# Patient Record
Sex: FEMALE | Race: WHITE | NOT HISPANIC OR LATINO
[De-identification: names, ages, dates, MRNs, and addresses within clinical notes are randomized per-mention and may not be internally consistent; named-entity substitution may affect disease eponyms.]

---

## 2019-12-16 ENCOUNTER — TRANSCRIPTION ENCOUNTER (OUTPATIENT)
Age: 34
End: 2019-12-16

## 2020-04-26 ENCOUNTER — MESSAGE (OUTPATIENT)
Age: 35
End: 2020-04-26

## 2021-03-25 ENCOUNTER — TRANSCRIPTION ENCOUNTER (OUTPATIENT)
Age: 36
End: 2021-03-25

## 2021-08-25 ENCOUNTER — EMERGENCY (EMERGENCY)
Facility: HOSPITAL | Age: 36
LOS: 1 days | Discharge: HOME | End: 2021-08-25
Attending: STUDENT IN AN ORGANIZED HEALTH CARE EDUCATION/TRAINING PROGRAM | Admitting: STUDENT IN AN ORGANIZED HEALTH CARE EDUCATION/TRAINING PROGRAM
Payer: COMMERCIAL

## 2021-08-25 VITALS
HEART RATE: 80 BPM | HEIGHT: 63 IN | DIASTOLIC BLOOD PRESSURE: 96 MMHG | WEIGHT: 153 LBS | SYSTOLIC BLOOD PRESSURE: 145 MMHG | RESPIRATION RATE: 18 BRPM | TEMPERATURE: 98 F

## 2021-08-25 VITALS
RESPIRATION RATE: 18 BRPM | HEART RATE: 78 BPM | TEMPERATURE: 99 F | OXYGEN SATURATION: 97 % | DIASTOLIC BLOOD PRESSURE: 78 MMHG | SYSTOLIC BLOOD PRESSURE: 140 MMHG

## 2021-08-25 LAB
ANION GAP SERPL CALC-SCNC: 6 MMOL/L — SIGNIFICANT CHANGE UP (ref 5–17)
BASOPHILS # BLD AUTO: 0.02 K/UL — SIGNIFICANT CHANGE UP (ref 0–0.2)
BASOPHILS NFR BLD AUTO: 0.3 % — SIGNIFICANT CHANGE UP (ref 0–2)
BUN SERPL-MCNC: 8 MG/DL — SIGNIFICANT CHANGE UP (ref 7–23)
CALCIUM SERPL-MCNC: 8.8 MG/DL — SIGNIFICANT CHANGE UP (ref 8.5–10.1)
CHLORIDE SERPL-SCNC: 108 MMOL/L — SIGNIFICANT CHANGE UP (ref 96–108)
CO2 SERPL-SCNC: 28 MMOL/L — SIGNIFICANT CHANGE UP (ref 22–31)
CREAT SERPL-MCNC: 0.47 MG/DL — LOW (ref 0.5–1.3)
EOSINOPHIL # BLD AUTO: 0.07 K/UL — SIGNIFICANT CHANGE UP (ref 0–0.5)
EOSINOPHIL NFR BLD AUTO: 1.1 % — SIGNIFICANT CHANGE UP (ref 0–6)
GLUCOSE SERPL-MCNC: 83 MG/DL — SIGNIFICANT CHANGE UP (ref 70–99)
HCG SERPL-ACNC: <1 MIU/ML — SIGNIFICANT CHANGE UP
HCT VFR BLD CALC: 36.6 % — SIGNIFICANT CHANGE UP (ref 34.5–45)
HGB BLD-MCNC: 12.5 G/DL — SIGNIFICANT CHANGE UP (ref 11.5–15.5)
IMM GRANULOCYTES NFR BLD AUTO: 0.3 % — SIGNIFICANT CHANGE UP (ref 0–1.5)
LYMPHOCYTES # BLD AUTO: 1.81 K/UL — SIGNIFICANT CHANGE UP (ref 1–3.3)
LYMPHOCYTES # BLD AUTO: 29.5 % — SIGNIFICANT CHANGE UP (ref 13–44)
MCHC RBC-ENTMCNC: 28.9 PG — SIGNIFICANT CHANGE UP (ref 27–34)
MCHC RBC-ENTMCNC: 34.2 GM/DL — SIGNIFICANT CHANGE UP (ref 32–36)
MCV RBC AUTO: 84.5 FL — SIGNIFICANT CHANGE UP (ref 80–100)
MONOCYTES # BLD AUTO: 0.33 K/UL — SIGNIFICANT CHANGE UP (ref 0–0.9)
MONOCYTES NFR BLD AUTO: 5.4 % — SIGNIFICANT CHANGE UP (ref 2–14)
NEUTROPHILS # BLD AUTO: 3.89 K/UL — SIGNIFICANT CHANGE UP (ref 1.8–7.4)
NEUTROPHILS NFR BLD AUTO: 63.4 % — SIGNIFICANT CHANGE UP (ref 43–77)
NRBC # BLD: 0 /100 WBCS — SIGNIFICANT CHANGE UP (ref 0–0)
PLATELET # BLD AUTO: 251 K/UL — SIGNIFICANT CHANGE UP (ref 150–400)
POTASSIUM SERPL-MCNC: 3.7 MMOL/L — SIGNIFICANT CHANGE UP (ref 3.5–5.3)
POTASSIUM SERPL-SCNC: 3.7 MMOL/L — SIGNIFICANT CHANGE UP (ref 3.5–5.3)
RBC # BLD: 4.33 M/UL — SIGNIFICANT CHANGE UP (ref 3.8–5.2)
RBC # FLD: 11.9 % — SIGNIFICANT CHANGE UP (ref 10.3–14.5)
SODIUM SERPL-SCNC: 142 MMOL/L — SIGNIFICANT CHANGE UP (ref 135–145)
WBC # BLD: 6.14 K/UL — SIGNIFICANT CHANGE UP (ref 3.8–10.5)
WBC # FLD AUTO: 6.14 K/UL — SIGNIFICANT CHANGE UP (ref 3.8–10.5)

## 2021-08-25 PROCEDURE — 96374 THER/PROPH/DIAG INJ IV PUSH: CPT

## 2021-08-25 PROCEDURE — 36415 COLL VENOUS BLD VENIPUNCTURE: CPT

## 2021-08-25 PROCEDURE — 96375 TX/PRO/DX INJ NEW DRUG ADDON: CPT

## 2021-08-25 PROCEDURE — 84702 CHORIONIC GONADOTROPIN TEST: CPT

## 2021-08-25 PROCEDURE — 99284 EMERGENCY DEPT VISIT MOD MDM: CPT | Mod: 25

## 2021-08-25 PROCEDURE — 99284 EMERGENCY DEPT VISIT MOD MDM: CPT

## 2021-08-25 PROCEDURE — 85025 COMPLETE CBC W/AUTO DIFF WBC: CPT

## 2021-08-25 PROCEDURE — 80048 BASIC METABOLIC PNL TOTAL CA: CPT

## 2021-08-25 RX ORDER — ACETAMINOPHEN 500 MG
1000 TABLET ORAL ONCE
Refills: 0 | Status: COMPLETED | OUTPATIENT
Start: 2021-08-25 | End: 2021-08-25

## 2021-08-25 RX ORDER — KETOROLAC TROMETHAMINE 30 MG/ML
15 SYRINGE (ML) INJECTION ONCE
Refills: 0 | Status: DISCONTINUED | OUTPATIENT
Start: 2021-08-25 | End: 2021-08-25

## 2021-08-25 RX ORDER — SODIUM CHLORIDE 9 MG/ML
1000 INJECTION INTRAMUSCULAR; INTRAVENOUS; SUBCUTANEOUS ONCE
Refills: 0 | Status: COMPLETED | OUTPATIENT
Start: 2021-08-25 | End: 2021-08-25

## 2021-08-25 RX ADMIN — Medication 15 MILLIGRAM(S): at 16:06

## 2021-08-25 RX ADMIN — Medication 400 MILLIGRAM(S): at 16:07

## 2021-08-25 RX ADMIN — SODIUM CHLORIDE 1000 MILLILITER(S): 9 INJECTION INTRAMUSCULAR; INTRAVENOUS; SUBCUTANEOUS at 15:53

## 2021-08-25 NOTE — ED PROVIDER NOTE - ATTENDING CONTRIBUTION TO CARE
36yo F otherwise healthy, covid + x1 week, pw generalized malaise, fatigue, aches, mild sob, no chest pain, no pleurisy, no fevers, no diarrhea, tolerating PO but has no appetite  well appearing, vitals wnl, lungs clear, amb sat 100%  will check basic labs, supportive care

## 2021-08-25 NOTE — ED PROVIDER NOTE - OBJECTIVE STATEMENT
36 yo F presents to ED c/o generalized weakness, malaise, fatigue diffuse body aches x 1 week sp covid diagnosis. Contrary to triage note, pt denies shortness of breath or chest pain.

## 2021-08-25 NOTE — ED PROVIDER NOTE - NSFOLLOWUPINSTRUCTIONS_ED_ALL_ED_FT
1) Supportive care as discussed. Rest, fluids. F/u with your primary care physician once your quarantine is complete.  2) Return to the ER for worsening or concerning symptoms        How to Recover from COVID-19 at Home    WHAT YOU NEED TO KNOW:    What do I need to know about recovery at home from COVID-19? COVID-19 can cause a range of symptoms, from mild to severe. If you do not need to be treated in a hospital, you will be given instructions to use at home. You will need to watch for worsening symptoms and seek immediate care if needed. You will also need to stay physically apart from others so you do not spread the virus to anyone. Information about COVID-19 is still being learned. It is not known if a person can be infected with the virus again after recovering from COVID-19. It is also not known if or for how long the virus can continue to be passed to others.    What can I do to manage my symptoms? Mild symptoms may get better on their own. The following may be used to manage your symptoms:   •Decongestants help reduce nasal congestion and help you breathe more easily. If you take decongestant pills, they may make you feel restless or cause problems with your sleep. Do not use decongestant sprays for more than a few days.      •Cough suppressants help reduce coughing. Ask your healthcare provider which type of cough medicine is best for you.      •To soothe a sore throat, gargle with warm salt water, or use throat lozenges or a throat spray. Your healthcare provider may recommend a cough medicine. Drink more liquids to thin and loosen mucus and to prevent dehydration. Use decongestants or saline drops as directed for nasal congestion.      •NSAIDs, such as ibuprofen, help decrease swelling, pain, and fever. NSAIDs can cause stomach bleeding or kidney problems in certain people. If you take blood thinner medicine, always ask your healthcare provider if NSAIDs are safe for you. Always read the medicine label and follow directions.      •Acetaminophen decreases pain and fever. It is available without a doctor's order. Ask how much to take and how often to take it. Follow directions. Read the labels of all other medicines you are using to see if they also contain acetaminophen, or ask your doctor or pharmacist. Acetaminophen can cause liver damage if not taken correctly. Do not use more than 4 grams (4,000 milligrams) total of acetaminophen in one day.       How can I keep others safe while I am recovering at home? Healthcare providers will give you specific instructions to follow. The following are general guidelines to remind you how to keep others safe until you are well:    Prevent COVID-19 Infection     •Wash your hands often. Use soap and water as much as possible. You can use hand  that contains alcohol if soap and water are not available. Do not share towels with anyone. If you use paper towels, throw them away in a lined trash can kept in your room or area. Use a covered trash can, if possible.  Handwashing           •Cover sneezes and coughs. Turn your face away and cover your mouth and nose with a tissue. Throw the tissue away. Use the bend of your arm if a tissue is not available. Then wash your hands well with soap and water or use hand .      •Wear a face covering (mask) around anyone who does not live in your home. A covering will help prevent you from passing the virus to others. It is not known for sure if or for how long the virus can be passed after recovery. Do not wear a plastic face shield instead of a covering. Use a cloth covering with at least 2 layers. You can also create layers by putting a cloth covering over a disposable non-medical mask. Cover your mouth and your nose. The covering should fit snugly against the bridge of your nose. Securely fasten it under your chin and on the sides of your face. A face covering is not a substitute for other safety measures. Continue social distancing and washing your hands often.  How to Wear a Face Covering (Mask)           •Do not go out of your home unless it is necessary. If possible, ask someone who is not infected to go out for groceries, medicines, and household items. Ask your healthcare provider for other ways to have appointments. Some providers offer phone, video, or other types of appointments. If you need to be seen in person, call ahead to make sure the office will be ready for you.      •Do not let anyone into your home, room, or area unless it is necessary. If possible, stay in a separate area or room of your home if you live with others. No one should go into the area or room except to give you care. Only allow medical professionals or other necessary helpers in. Wear a face covering. Remind them to wear face coverings and to wash their hands. If possible, ask someone who is well to care for your baby. You can put breast milk in bottles for the person to use, if needed. Wear a clean face covering if you need to breastfeed or express or pump breast milk. Family members and friends should not visit you. You can visit with others by phone, video chat, e-mail, or similar systems. It is important to stay connected with others in your life while you recover.      •Talk to your healthcare provider about your baby. Tell him or her if you have any questions or concerns about caring for or bonding with your baby. He or she will tell you when to bring your baby in for check-ups and vaccines. He or she will also tell you what to do if you think your baby was infected with the coronavirus.      •Do not handle live animals unless it is necessary. Until more is known, it is best not to touch, play with, or handle live animals. Some animals, including pets, have been infected with the new coronavirus. Do not handle or care for animals until you are well. Care includes feeding, petting, and cuddling your pet. Do not let your pet lick you or share your food. Ask someone who is not infected to take care of your pet, if possible. If you must care for a pet, wear a face covering. Wash your hands before and after you give care. Talk to your healthcare provider about how to keep a service animal safe, if needed.      •Follow directions from your healthcare provider for being around others after you recover. It is not known for sure if or for how long a recovered person can pass the virus to others. Your provider may give you instructions, such as continuing social distancing or wearing a face covering around others. The following are general guidelines for when you can be around others:?If you never developed any symptoms, wait at least 10 days after your positive test. Your provider may want you to have 2 negative tests in a row at least 24 hours apart. This depends on how available testing is in your area.      ?If you did have symptoms, wait at least 10 days after the symptoms first appeared. Then you will need to have no fever for 24 hours without fever medicine. Most of your symptoms will also need to be gone. A loss of taste or smell may continue for several months. It is considered okay to be around others if this is your only symptom.      ?If you were hospitalized for COVID-19 and needed oxygen, your provider will tell you how long to wait. You may need to wait until 20 days after symptoms appeared. It may be less if you have 2 negative tests in a row at least 24 hours apart. This will depend on how available testing is in your area.        Where can I find more information?   •Centers for Disease Control and Prevention  [address] [place] [address]  Phone: 1-333.580.8449  Web Address: http://www.cdc.gov        What should I do if I think someone in my home may be infected? Do the following to protect others:   •If emergency care is needed, tell the  about the possible infection, or call ahead and tell the emergency department.      •Call a healthcare provider for instructions if symptoms are mild. Anyone who may be infected should not arrive without calling first. The provider will need to protect staff members and other patients.      •The person who may be infected needs to wear a face covering while getting medical care. This will help lower the risk of infecting others. Coverings are not used for anyone who is younger than 2 years, has breathing problems, or cannot remove it. The provider can give you instructions for anyone who cannot wear a covering.      Call your local emergency number (911 in the [place]) or an emergency department if:   •You have trouble breathing or shortness of breath at rest.      •You have chest pain or pressure that lasts longer than 5 minutes.      •You become confused or hard to wake.      •Your lips or face are blue.      •You have a fever of 104°F (40°C) or higher.      When should I call my doctor?   •You have new, returning, or worsening symptoms.      •Someone in your home does not have symptoms of COVID-19 but had close physical contact within 14 days with you.      •You have questions or concerns about your condition or care.      CARE AGREEMENT:    You have the right to help plan your care. Learn about your health condition and how it may be treated. Discuss treatment options with your healthcare providers to decide what care you want to receive. You always have the right to refuse treatment.        © Copyright [employer] 2021

## 2021-08-25 NOTE — ED PROVIDER NOTE - PATIENT PORTAL LINK FT
You can access the FollowMyHealth Patient Portal offered by [facility] by registering at the following website: [email or web address]. By joining [employer]’s FollowMyHealth portal, you will also be able to view your health information using other applications (apps) compatible with our system.

## 2021-08-25 NOTE — ED PROVIDER NOTE - NSICDXPASTMEDICALHX_GEN_ALL_CORE_FT
PAST MEDICAL HISTORY:  Blood pressure elevated a little high at drs office; being monitored    Genital herpes     Lightheadedness last week-went to  who thinks its possibly hypoglycemia; now eating 6 small meals per day and feels fine

## 2021-08-25 NOTE — ED PROVIDER NOTE - PROGRESS NOTE DETAILS
Labs wnl. pt reports symptomatic improvement. . VSS. O2 Sat 99% RA with exertion (pt walked around hospital room for a couple of mins - pt continues without chest pain or SOB) pt advised supportive care and pcp fu. Discussed the results of all diagnostic testing in ED and copies of all reports given.   Pt was given an opportunity to have all questions answered to satisfaction.  Discussed the importance of prompt, close medical follow-up. ED return precautions discussed at length.  Pt verbalizes agreement and understanding of plan and ED return precautions. Pt well appearing, stable for DC home. No emergent concerns at this time.

## 2021-08-25 NOTE — ED PROVIDER NOTE - CLINICAL SUMMARY MEDICAL DECISION MAKING FREE TEXT BOX
34 yo F known covid pos p/w weakness and body aches-- check basic labs, lytes, analgesia, IVF reassess

## 2022-02-15 ENCOUNTER — TRANSCRIPTION ENCOUNTER (OUTPATIENT)
Age: 37
End: 2022-02-15

## 2022-06-06 ENCOUNTER — APPOINTMENT (OUTPATIENT)
Dept: ANTEPARTUM | Facility: CLINIC | Age: 37
End: 2022-06-06
Payer: COMMERCIAL

## 2022-06-06 ENCOUNTER — ASOB RESULT (OUTPATIENT)
Age: 37
End: 2022-06-06

## 2022-06-06 PROBLEM — Z00.00 ENCOUNTER FOR PREVENTIVE HEALTH EXAMINATION: Status: ACTIVE | Noted: 2022-06-06

## 2022-06-06 PROCEDURE — 76801 OB US < 14 WKS SINGLE FETUS: CPT

## 2022-06-06 PROCEDURE — 76813 OB US NUCHAL MEAS 1 GEST: CPT | Mod: 59

## 2022-08-02 ENCOUNTER — ASOB RESULT (OUTPATIENT)
Age: 37
End: 2022-08-02

## 2022-08-02 ENCOUNTER — APPOINTMENT (OUTPATIENT)
Dept: ANTEPARTUM | Facility: CLINIC | Age: 37
End: 2022-08-02

## 2022-08-02 PROCEDURE — 76811 OB US DETAILED SNGL FETUS: CPT

## 2022-10-23 ENCOUNTER — OUTPATIENT (OUTPATIENT)
Dept: OUTPATIENT SERVICES | Facility: HOSPITAL | Age: 37
LOS: 1 days | End: 2022-10-23
Payer: COMMERCIAL

## 2022-10-23 VITALS
SYSTOLIC BLOOD PRESSURE: 132 MMHG | DIASTOLIC BLOOD PRESSURE: 63 MMHG | HEART RATE: 89 BPM | TEMPERATURE: 99 F | OXYGEN SATURATION: 97 % | RESPIRATION RATE: 18 BRPM

## 2022-10-23 DIAGNOSIS — O26.899 OTHER SPECIFIED PREGNANCY RELATED CONDITIONS, UNSPECIFIED TRIMESTER: ICD-10-CM

## 2022-10-23 DIAGNOSIS — Z3A.00 WEEKS OF GESTATION OF PREGNANCY NOT SPECIFIED: ICD-10-CM

## 2022-10-23 PROCEDURE — 59025 FETAL NON-STRESS TEST: CPT

## 2022-10-23 PROCEDURE — G0463: CPT

## 2022-10-23 NOTE — OB PROVIDER TRIAGE NOTE - NSOBPROVIDERNOTE_OBGYN_ALL_OB_FT
4 hour monitoring    Juventino, PGY3 Pt is a 37yo  @32.3 presenting s/p fall w/o abdominal trauma.    -BPP 8/8  -NST reactive  -CFM for 4hrs s/p fall  -pt is Rh+, no indication for rhogam at this time    Juventino, PGY3  d/w Dr. Valdez

## 2022-10-23 NOTE — OB PROVIDER TRIAGE NOTE - NSHPPHYSICALEXAM_GEN_ALL_CORE
Objective  T(C): 37.1 (10-23-22 @ 22:36), Max: 37.1 (10-23-22 @ 22:28)  HR: 91 (10-23-22 @ 23:05) (80 - 99)  BP: 119/58 (10-23-22 @ 23:05) (119/58 - 132/67)  RR: 18 (10-23-22 @ 22:28) (18 - 18)  SpO2: 98% (10-23-22 @ 23:05) (97% - 99%)    PE:   CV: RRR  Pulm: breathing comfortably on RA  Abd: gravid, nontender  Extr: moving all extremities with ease  FHT: baseline 130, mod variability, +accels, -decels  [place]: none  Sono: vertex Objective  T(C): 37.1 (10-23-22 @ 22:36), Max: 37.1 (10-23-22 @ 22:28)  HR: 91 (10-23-22 @ 23:05) (80 - 99)  BP: 119/58 (10-23-22 @ 23:05) (119/58 - 132/67)  RR: 18 (10-23-22 @ 22:28) (18 - 18)  SpO2: 98% (10-23-22 @ 23:05) (97% - 99%)    PE:   CV: RRR  Pulm: breathing comfortably on RA  Abd: gravid, nontender  Extr: moving all extremities with ease  FHT: baseline 130, mod variability, +accels, -decels  [place]: none  Sono: vertex, anterior placenta, MAXIMO 12.72, BPP 10/10

## 2022-10-23 NOTE — OB PROVIDER TRIAGE NOTE - HISTORY OF PRESENT ILLNESS
37yo  @32.3wks presents s/p fall this evening. Pt states she slipped on the hardwood floor at the top of her stairs & fell down 5 steps on her back & her bottom. Pt did not hit her abdomen, state she may have tapped her head on the stairs but did not have any significant head trauma. Pt c/o lower back & buttox pain. Denies LOF, ctxs, VB, vaginal discharge, dysuria, HA, CP, SOB.  Endorses good fetal movement. Denies any other concerns at this time.    – PNC: Denies prenatal issues. GBS unknown  – OBHx:   c/b gHTN-6#15  – GynHx: h/o abnormal pap smear now resolved. H/o HSV, last outbreak 1mo ago. Denies h/o fibroids.  – PMH: denies  – PSH: R ACL repair   – Psych: denies h/o anxiety/depression  – Social: denies h/o toxic habits in pregnancy  – Meds: PNV, ASA   – Allergies: NKDA  – Will accept blood transfusions? Yes

## 2022-10-24 VITALS — SYSTOLIC BLOOD PRESSURE: 130 MMHG | TEMPERATURE: 98 F | DIASTOLIC BLOOD PRESSURE: 78 MMHG | HEART RATE: 93 BPM

## 2022-11-21 ENCOUNTER — APPOINTMENT (OUTPATIENT)
Dept: ANTEPARTUM | Facility: CLINIC | Age: 37
End: 2022-11-21

## 2022-11-21 ENCOUNTER — ASOB RESULT (OUTPATIENT)
Age: 37
End: 2022-11-21

## 2022-11-21 PROCEDURE — 76816 OB US FOLLOW-UP PER FETUS: CPT

## 2022-11-21 PROCEDURE — 76819 FETAL BIOPHYS PROFIL W/O NST: CPT | Mod: 59

## 2022-12-05 NOTE — ED PROVIDER NOTE - SKIN, MLM
1. Other fatigue  A - check testosterone - fasting 8 AM.  B - repeat CBC in 1 month 1/5/2023  - Testosterone Free MS/Dialysis;  Future  - CBC with Auto Differential; Future
Skin normal color for race, warm, dry and intact. No evidence of rash.

## 2022-12-15 ENCOUNTER — APPOINTMENT (OUTPATIENT)
Dept: ANTEPARTUM | Facility: CLINIC | Age: 37
End: 2022-12-15

## 2022-12-15 ENCOUNTER — ASOB RESULT (OUTPATIENT)
Age: 37
End: 2022-12-15

## 2022-12-15 PROCEDURE — 76819 FETAL BIOPHYS PROFIL W/O NST: CPT | Mod: 59

## 2022-12-15 PROCEDURE — 76816 OB US FOLLOW-UP PER FETUS: CPT

## 2022-12-19 ENCOUNTER — INPATIENT (INPATIENT)
Facility: HOSPITAL | Age: 37
LOS: 1 days | Discharge: HOME | End: 2022-12-21
Attending: OBSTETRICS & GYNECOLOGY | Admitting: OBSTETRICS & GYNECOLOGY
Payer: COMMERCIAL

## 2022-12-19 VITALS — DIASTOLIC BLOOD PRESSURE: 74 MMHG | HEART RATE: 80 BPM | SYSTOLIC BLOOD PRESSURE: 151 MMHG

## 2022-12-19 DIAGNOSIS — Z98.890 OTHER SPECIFIED POSTPROCEDURAL STATES: Chronic | ICD-10-CM

## 2022-12-19 DIAGNOSIS — O26.899 OTHER SPECIFIED PREGNANCY RELATED CONDITIONS, UNSPECIFIED TRIMESTER: ICD-10-CM

## 2022-12-19 DIAGNOSIS — Z3A.00 WEEKS OF GESTATION OF PREGNANCY NOT SPECIFIED: ICD-10-CM

## 2022-12-19 DIAGNOSIS — Z34.80 ENCOUNTER FOR SUPERVISION OF OTHER NORMAL PREGNANCY, UNSPECIFIED TRIMESTER: ICD-10-CM

## 2022-12-19 LAB
ALBUMIN SERPL ELPH-MCNC: 4 G/DL — SIGNIFICANT CHANGE UP (ref 3.3–5)
ALP SERPL-CCNC: 150 U/L — HIGH (ref 40–120)
ALT FLD-CCNC: 14 U/L — SIGNIFICANT CHANGE UP (ref 10–45)
ANION GAP SERPL CALC-SCNC: 16 MMOL/L — SIGNIFICANT CHANGE UP (ref 5–17)
APPEARANCE UR: ABNORMAL
APTT BLD: 26.1 SEC — LOW (ref 27.5–35.5)
AST SERPL-CCNC: 25 U/L — SIGNIFICANT CHANGE UP (ref 10–40)
BACTERIA # UR AUTO: ABNORMAL
BASOPHILS # BLD AUTO: 0.03 K/UL — SIGNIFICANT CHANGE UP (ref 0–0.2)
BASOPHILS NFR BLD AUTO: 0.3 % — SIGNIFICANT CHANGE UP (ref 0–2)
BILIRUB SERPL-MCNC: 0.3 MG/DL — SIGNIFICANT CHANGE UP (ref 0.2–1.2)
BILIRUB UR-MCNC: NEGATIVE — SIGNIFICANT CHANGE UP
BLD GP AB SCN SERPL QL: NEGATIVE — SIGNIFICANT CHANGE UP
BUN SERPL-MCNC: 9 MG/DL — SIGNIFICANT CHANGE UP (ref 7–23)
CALCIUM SERPL-MCNC: 9.7 MG/DL — SIGNIFICANT CHANGE UP (ref 8.4–10.5)
CHLORIDE SERPL-SCNC: 102 MMOL/L — SIGNIFICANT CHANGE UP (ref 96–108)
CO2 SERPL-SCNC: 20 MMOL/L — LOW (ref 22–31)
COLOR SPEC: SIGNIFICANT CHANGE UP
COVID-19 SPIKE DOMAIN AB INTERP: POSITIVE
COVID-19 SPIKE DOMAIN ANTIBODY RESULT: >250 U/ML — HIGH
CREAT ?TM UR-MCNC: 68 MG/DL — SIGNIFICANT CHANGE UP
CREAT SERPL-MCNC: 0.47 MG/DL — LOW (ref 0.5–1.3)
DIFF PNL FLD: NEGATIVE — SIGNIFICANT CHANGE UP
EGFR: 127 ML/MIN/1.73M2 — SIGNIFICANT CHANGE UP
EOSINOPHIL # BLD AUTO: 0.05 K/UL — SIGNIFICANT CHANGE UP (ref 0–0.5)
EOSINOPHIL NFR BLD AUTO: 0.4 % — SIGNIFICANT CHANGE UP (ref 0–6)
EPI CELLS # UR: 9 /HPF — HIGH
FIBRINOGEN PPP-MCNC: 500 MG/DL — HIGH (ref 200–445)
GLUCOSE SERPL-MCNC: 61 MG/DL — LOW (ref 70–99)
GLUCOSE UR QL: NEGATIVE — SIGNIFICANT CHANGE UP
HCT VFR BLD CALC: 34.9 % — SIGNIFICANT CHANGE UP (ref 34.5–45)
HGB BLD-MCNC: 11.7 G/DL — SIGNIFICANT CHANGE UP (ref 11.5–15.5)
HYALINE CASTS # UR AUTO: 1 /LPF — SIGNIFICANT CHANGE UP (ref 0–7)
IMM GRANULOCYTES NFR BLD AUTO: 0.9 % — SIGNIFICANT CHANGE UP (ref 0–0.9)
INR BLD: 0.92 RATIO — SIGNIFICANT CHANGE UP (ref 0.88–1.16)
KETONES UR-MCNC: NEGATIVE — SIGNIFICANT CHANGE UP
LDH SERPL L TO P-CCNC: 225 U/L — SIGNIFICANT CHANGE UP (ref 50–242)
LEUKOCYTE ESTERASE UR-ACNC: NEGATIVE — SIGNIFICANT CHANGE UP
LYMPHOCYTES # BLD AUTO: 1.56 K/UL — SIGNIFICANT CHANGE UP (ref 1–3.3)
LYMPHOCYTES # BLD AUTO: 13.4 % — SIGNIFICANT CHANGE UP (ref 13–44)
MCHC RBC-ENTMCNC: 29.5 PG — SIGNIFICANT CHANGE UP (ref 27–34)
MCHC RBC-ENTMCNC: 33.5 GM/DL — SIGNIFICANT CHANGE UP (ref 32–36)
MCV RBC AUTO: 87.9 FL — SIGNIFICANT CHANGE UP (ref 80–100)
MONOCYTES # BLD AUTO: 0.55 K/UL — SIGNIFICANT CHANGE UP (ref 0–0.9)
MONOCYTES NFR BLD AUTO: 4.7 % — SIGNIFICANT CHANGE UP (ref 2–14)
NEUTROPHILS # BLD AUTO: 9.37 K/UL — HIGH (ref 1.8–7.4)
NEUTROPHILS NFR BLD AUTO: 80.3 % — HIGH (ref 43–77)
NITRITE UR-MCNC: NEGATIVE — SIGNIFICANT CHANGE UP
NRBC # BLD: 0 /100 WBCS — SIGNIFICANT CHANGE UP (ref 0–0)
PH UR: 7 — SIGNIFICANT CHANGE UP (ref 5–8)
PLATELET # BLD AUTO: 231 K/UL — SIGNIFICANT CHANGE UP (ref 150–400)
POTASSIUM SERPL-MCNC: 3.8 MMOL/L — SIGNIFICANT CHANGE UP (ref 3.5–5.3)
POTASSIUM SERPL-SCNC: 3.8 MMOL/L — SIGNIFICANT CHANGE UP (ref 3.5–5.3)
PROT ?TM UR-MCNC: 11 MG/DL — SIGNIFICANT CHANGE UP (ref 0–12)
PROT SERPL-MCNC: 7 G/DL — SIGNIFICANT CHANGE UP (ref 6–8.3)
PROT UR-MCNC: SIGNIFICANT CHANGE UP
PROT/CREAT UR-RTO: 0.2 RATIO — SIGNIFICANT CHANGE UP (ref 0–0.2)
PROTHROM AB SERPL-ACNC: 10.7 SEC — SIGNIFICANT CHANGE UP (ref 10.5–13.4)
RBC # BLD: 3.97 M/UL — SIGNIFICANT CHANGE UP (ref 3.8–5.2)
RBC # FLD: 14.1 % — SIGNIFICANT CHANGE UP (ref 10.3–14.5)
RBC CASTS # UR COMP ASSIST: 4 /HPF — SIGNIFICANT CHANGE UP (ref 0–4)
RH IG SCN BLD-IMP: POSITIVE — SIGNIFICANT CHANGE UP
SARS-COV-2 IGG+IGM SERPL QL IA: >250 U/ML — HIGH
SARS-COV-2 IGG+IGM SERPL QL IA: POSITIVE
SARS-COV-2 RNA SPEC QL NAA+PROBE: SIGNIFICANT CHANGE UP
SODIUM SERPL-SCNC: 138 MMOL/L — SIGNIFICANT CHANGE UP (ref 135–145)
SP GR SPEC: 1.02 — SIGNIFICANT CHANGE UP (ref 1.01–1.02)
URATE SERPL-MCNC: 3.7 MG/DL — SIGNIFICANT CHANGE UP (ref 2.5–7)
UROBILINOGEN FLD QL: NEGATIVE — SIGNIFICANT CHANGE UP
WBC # BLD: 11.66 K/UL — HIGH (ref 3.8–10.5)
WBC # FLD AUTO: 11.66 K/UL — HIGH (ref 3.8–10.5)
WBC UR QL: 13 /HPF — HIGH (ref 0–5)

## 2022-12-19 RX ORDER — LANOLIN
1 OINTMENT (GRAM) TOPICAL EVERY 6 HOURS
Refills: 0 | Status: DISCONTINUED | OUTPATIENT
Start: 2022-12-19 | End: 2022-12-21

## 2022-12-19 RX ORDER — SODIUM CHLORIDE 9 MG/ML
1000 INJECTION, SOLUTION INTRAVENOUS
Refills: 0 | Status: DISCONTINUED | OUTPATIENT
Start: 2022-12-19 | End: 2022-12-20

## 2022-12-19 RX ORDER — DIBUCAINE 1 %
1 OINTMENT (GRAM) RECTAL EVERY 6 HOURS
Refills: 0 | Status: DISCONTINUED | OUTPATIENT
Start: 2022-12-19 | End: 2022-12-21

## 2022-12-19 RX ORDER — TETANUS TOXOID, REDUCED DIPHTHERIA TOXOID AND ACELLULAR PERTUSSIS VACCINE, ADSORBED 5; 2.5; 8; 8; 2.5 [IU]/.5ML; [IU]/.5ML; UG/.5ML; UG/.5ML; UG/.5ML
0.5 SUSPENSION INTRAMUSCULAR ONCE
Refills: 0 | Status: DISCONTINUED | OUTPATIENT
Start: 2022-12-19 | End: 2022-12-21

## 2022-12-19 RX ORDER — SIMETHICONE 80 MG/1
80 TABLET, CHEWABLE ORAL EVERY 4 HOURS
Refills: 0 | Status: DISCONTINUED | OUTPATIENT
Start: 2022-12-19 | End: 2022-12-21

## 2022-12-19 RX ORDER — DIPHENHYDRAMINE HCL 50 MG
25 CAPSULE ORAL EVERY 6 HOURS
Refills: 0 | Status: DISCONTINUED | OUTPATIENT
Start: 2022-12-19 | End: 2022-12-21

## 2022-12-19 RX ORDER — SODIUM CHLORIDE 9 MG/ML
3 INJECTION INTRAMUSCULAR; INTRAVENOUS; SUBCUTANEOUS EVERY 8 HOURS
Refills: 0 | Status: DISCONTINUED | OUTPATIENT
Start: 2022-12-19 | End: 2022-12-21

## 2022-12-19 RX ORDER — CITRIC ACID/SODIUM CITRATE 300-500 MG
15 SOLUTION, ORAL ORAL EVERY 6 HOURS
Refills: 0 | Status: DISCONTINUED | OUTPATIENT
Start: 2022-12-19 | End: 2022-12-20

## 2022-12-19 RX ORDER — ACETAMINOPHEN 500 MG
975 TABLET ORAL
Refills: 0 | Status: DISCONTINUED | OUTPATIENT
Start: 2022-12-19 | End: 2022-12-21

## 2022-12-19 RX ORDER — CHLORHEXIDINE GLUCONATE 213 G/1000ML
1 SOLUTION TOPICAL ONCE
Refills: 0 | Status: DISCONTINUED | OUTPATIENT
Start: 2022-12-19 | End: 2022-12-20

## 2022-12-19 RX ORDER — BENZOCAINE 10 %
1 GEL (GRAM) MUCOUS MEMBRANE EVERY 6 HOURS
Refills: 0 | Status: DISCONTINUED | OUTPATIENT
Start: 2022-12-19 | End: 2022-12-21

## 2022-12-19 RX ORDER — HYDROCORTISONE 1 %
1 OINTMENT (GRAM) TOPICAL EVERY 6 HOURS
Refills: 0 | Status: DISCONTINUED | OUTPATIENT
Start: 2022-12-19 | End: 2022-12-21

## 2022-12-19 RX ORDER — OXYCODONE HYDROCHLORIDE 5 MG/1
5 TABLET ORAL
Refills: 0 | Status: DISCONTINUED | OUTPATIENT
Start: 2022-12-19 | End: 2022-12-21

## 2022-12-19 RX ORDER — OXYTOCIN 10 UNIT/ML
333.33 VIAL (ML) INJECTION
Qty: 20 | Refills: 0 | Status: DISCONTINUED | OUTPATIENT
Start: 2022-12-19 | End: 2022-12-21

## 2022-12-19 RX ORDER — AER TRAVELER 0.5 G/1
1 SOLUTION RECTAL; TOPICAL EVERY 4 HOURS
Refills: 0 | Status: DISCONTINUED | OUTPATIENT
Start: 2022-12-19 | End: 2022-12-21

## 2022-12-19 RX ORDER — MAGNESIUM HYDROXIDE 400 MG/1
30 TABLET, CHEWABLE ORAL
Refills: 0 | Status: DISCONTINUED | OUTPATIENT
Start: 2022-12-19 | End: 2022-12-21

## 2022-12-19 RX ORDER — KETOROLAC TROMETHAMINE 30 MG/ML
30 SYRINGE (ML) INJECTION ONCE
Refills: 0 | Status: DISCONTINUED | OUTPATIENT
Start: 2022-12-19 | End: 2022-12-19

## 2022-12-19 RX ORDER — OXYTOCIN 10 UNIT/ML
4 VIAL (ML) INJECTION
Qty: 30 | Refills: 0 | Status: DISCONTINUED | OUTPATIENT
Start: 2022-12-19 | End: 2022-12-21

## 2022-12-19 RX ORDER — PRAMOXINE HYDROCHLORIDE 150 MG/15G
1 AEROSOL, FOAM RECTAL EVERY 4 HOURS
Refills: 0 | Status: DISCONTINUED | OUTPATIENT
Start: 2022-12-19 | End: 2022-12-21

## 2022-12-19 RX ORDER — OXYCODONE HYDROCHLORIDE 5 MG/1
5 TABLET ORAL ONCE
Refills: 0 | Status: DISCONTINUED | OUTPATIENT
Start: 2022-12-19 | End: 2022-12-21

## 2022-12-19 RX ORDER — IBUPROFEN 200 MG
600 TABLET ORAL EVERY 6 HOURS
Refills: 0 | Status: COMPLETED | OUTPATIENT
Start: 2022-12-19 | End: 2023-11-17

## 2022-12-19 RX ORDER — OXYTOCIN 10 UNIT/ML
41.67 VIAL (ML) INJECTION
Qty: 20 | Refills: 0 | Status: DISCONTINUED | OUTPATIENT
Start: 2022-12-19 | End: 2022-12-21

## 2022-12-19 RX ADMIN — SODIUM CHLORIDE 125 MILLILITER(S): 9 INJECTION, SOLUTION INTRAVENOUS at 11:50

## 2022-12-19 RX ADMIN — Medication 125 MILLIUNIT(S)/MIN: at 23:35

## 2022-12-19 RX ADMIN — SODIUM CHLORIDE 125 MILLILITER(S): 9 INJECTION, SOLUTION INTRAVENOUS at 11:25

## 2022-12-19 RX ADMIN — Medication 30 MILLIGRAM(S): at 23:03

## 2022-12-19 RX ADMIN — Medication 4 MILLIUNIT(S)/MIN: at 12:43

## 2022-12-19 NOTE — OB PROVIDER H&P - HISTORY OF PRESENT ILLNESS
PA Note:  37y  @40wks and 4 days gestation presenting with contractions and decreased FM. Patient states the ctx's started @330am, are q7-10m and rated as a 4/10. She denies LOF or VB. Patient also reports decreased FM since she woke up with ctx's but states she felt movement in the car when she was coming to hospital and in triage currently. PNC uncomplicated. GBS -. EFW 7#11 done by ultrasound.    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 Hx of abnormal pap smear with repeat paps WNL. Denies fibroids, ovarian cysts   PMHx: Denies  Medications: PNV, Valtrex, ASA  Allergies: NKDA  PSHx: R ACL repair  Social Hx: Denies etoh/tobacco/drug use. Denies anxiety/depression    Vital Signs Last 24 Hrs  T(C): --  T(F): --  HR: 90 (19 Dec 2022 10:55) (78 - 98)  BP: 128/64 (19 Dec 2022 10:50) (128/64 - 151/74)  BP(mean): --  RR: --  SpO2: 98% (19 Dec 2022 11:00) (97% - 98%)    General: Mildly uncomfortable with ctx's, A&Ox3  CV: RRR  Lungs: CTA b/l  Abdomen: Soft, NT, gravid    VE: 2.5/70/-3  SSE: negative, no lesions seen   Bedside sono: Vertex presentation   EFM: 140/moderate variability/+accels/no decels  [place]: q7-9m

## 2022-12-19 NOTE — OB RN DELIVERY SUMMARY - NSSELHIDDEN_OBGYN_ALL_OB_FT
[NS_DeliveryAttending1_OBGYN_ALL_OB_FT:[id number]],[NS_DeliveryRN_OBGYN_ALL_OB_FT:[id number]],[NS_DeliveryAttending2_OBGYN_ALL_OB_FT:[id number]]

## 2022-12-19 NOTE — OB RN PATIENT PROFILE - FALL HARM RISK - UNIVERSAL INTERVENTIONS
Bed in lowest position, wheels locked, appropriate side rails in place/Call bell, personal items and telephone in reach/Instruct patient to call for assistance before getting out of bed or chair/Non-slip footwear when patient is out of bed/[place] to call system/Physically safe environment - no spills, clutter or unnecessary equipment/Purposeful Proactive Rounding/Room/bathroom lighting operational, light cord in reach

## 2022-12-19 NOTE — PRE-ANESTHESIA EVALUATION ADULT - BP NONINVASIVE SYSTOLIC (MM HG)
134
Other (Free Text): present x months\\nno improvement with vinegar soaks, topical AF and oral abx\\n? paronychia vs fungal vs neoplasm \\nbx to clarify diagnosis\\nshe has stage 3 kidney dz
Note Text (......Xxx Chief Complaint.): This diagnosis correlates with the
Detail Level: Simple

## 2022-12-19 NOTE — OB PROVIDER H&P - NSHPPHYSICALEXAM_GEN_ALL_CORE
Vital Signs Last 24 Hrs  T(C): --  T(F): --  HR: 90 (19 Dec 2022 10:55) (78 - 98)  BP: 128/64 (19 Dec 2022 10:50) (128/64 - 151/74)  BP(mean): --  RR: --  SpO2: 98% (19 Dec 2022 11:00) (97% - 98%)    General: Mildly uncomfortable with ctx's, A&Ox3  CV: RRR  Lungs: CTA b/l  Abdomen: Soft, NT, gravid

## 2022-12-19 NOTE — OB PROVIDER DELIVERY SUMMARY - NSPROVIDERDELIVERYNOTE_OBGYN_ALL_OB_FT
of baby girl over small 2nd degree laceration  apgars   ebl 150  placenta delivered intact  repair of laceration with 2-0 vicryl

## 2022-12-19 NOTE — OB PROVIDER H&P - NSLOWPPHRISK_OBGYN_A_OB
No previous uterine incision/Johnson Pregnancy/Less than or equal to 4 previous vaginal births/No known bleeding disorder/No history of postpartum hemorrhage/No other PPH risks indicated

## 2022-12-19 NOTE — OB PROVIDER H&P - ASSESSMENT
A/P:  37y  @40wks and 4 days gestation presenting in early labor. Patient also noted to have decreased FM @ home this AM and mild range BP's in triage. +FM. GBS -. EFW 3600g  -Admit to L&D  -Routine labs  -HELLP labs, P/Cr ratio to be sent   -Continue to monitor BP's q15m   -EFM/[place]  -NPO, IVF, Bicitra  -Expectant mgmt  -Anesthesia consult  -Anticipate   D/w Dr. Fernandez who also saw pt at bedside  Poornima Bardales PA-C

## 2022-12-19 NOTE — OB RN DELIVERY SUMMARY - NS_SEPSISRSKCALC_OBGYN_ALL_OB_FT
EOS calculated successfully. EOS Risk Factor: 0.5/1000 live births ([facility] national incidence); GA=40w4d; Temp=98.6; ROM=3.283; GBS='Negative'; Antibiotics='No antibiotics or any antibiotics < 2 hrs prior to birth'

## 2022-12-19 NOTE — OB PROVIDER LABOR PROGRESS NOTE - ASSESSMENT
Active labor  anticipate vaginal delivery  will decrease pitocin and continue to monitor for labor progression

## 2022-12-20 ENCOUNTER — TRANSCRIPTION ENCOUNTER (OUTPATIENT)
Age: 37
End: 2022-12-20

## 2022-12-20 DIAGNOSIS — Z86.19 PERSONAL HISTORY OF OTHER INFECTIOUS AND PARASITIC DISEASES: ICD-10-CM

## 2022-12-20 LAB — T PALLIDUM AB TITR SER: NEGATIVE — SIGNIFICANT CHANGE UP

## 2022-12-20 RX ORDER — VALACYCLOVIR 500 MG/1
500 TABLET, FILM COATED ORAL EVERY 12 HOURS
Refills: 0 | Status: DISCONTINUED | OUTPATIENT
Start: 2022-12-20 | End: 2022-12-21

## 2022-12-20 RX ORDER — IBUPROFEN 200 MG
600 TABLET ORAL EVERY 6 HOURS
Refills: 0 | Status: DISCONTINUED | OUTPATIENT
Start: 2022-12-20 | End: 2022-12-21

## 2022-12-20 RX ORDER — SIMETHICONE 80 MG/1
1 TABLET, CHEWABLE ORAL
Qty: 0 | Refills: 0 | DISCHARGE
Start: 2022-12-20

## 2022-12-20 RX ORDER — LANOLIN
1 OINTMENT (GRAM) TOPICAL
Qty: 0 | Refills: 0 | DISCHARGE
Start: 2022-12-20

## 2022-12-20 RX ORDER — ASPIRIN/CALCIUM CARB/MAGNESIUM 324 MG
1 TABLET ORAL
Qty: 0 | Refills: 0 | DISCHARGE

## 2022-12-20 RX ORDER — FERROUS SULFATE 325(65) MG
1 TABLET ORAL
Qty: 0 | Refills: 0 | DISCHARGE

## 2022-12-20 RX ORDER — DOCUSATE SODIUM 100 MG
1 CAPSULE ORAL
Qty: 0 | Refills: 0 | DISCHARGE

## 2022-12-20 RX ORDER — ACETAMINOPHEN 500 MG
2 TABLET ORAL
Qty: 0 | Refills: 0 | DISCHARGE

## 2022-12-20 RX ORDER — VALACYCLOVIR 500 MG/1
1 TABLET, FILM COATED ORAL
Qty: 0 | Refills: 0 | DISCHARGE

## 2022-12-20 RX ORDER — IBUPROFEN 200 MG
3 TABLET ORAL
Qty: 0 | Refills: 0 | DISCHARGE

## 2022-12-20 RX ADMIN — Medication 600 MILLIGRAM(S): at 11:31

## 2022-12-20 RX ADMIN — Medication 600 MILLIGRAM(S): at 06:33

## 2022-12-20 RX ADMIN — SODIUM CHLORIDE 3 MILLILITER(S): 9 INJECTION INTRAMUSCULAR; INTRAVENOUS; SUBCUTANEOUS at 05:11

## 2022-12-20 RX ADMIN — Medication 975 MILLIGRAM(S): at 22:00

## 2022-12-20 RX ADMIN — Medication 975 MILLIGRAM(S): at 21:14

## 2022-12-20 RX ADMIN — VALACYCLOVIR 500 MILLIGRAM(S): 500 TABLET, FILM COATED ORAL at 19:05

## 2022-12-20 RX ADMIN — Medication 975 MILLIGRAM(S): at 15:08

## 2022-12-20 RX ADMIN — Medication 600 MILLIGRAM(S): at 18:14

## 2022-12-20 RX ADMIN — Medication 975 MILLIGRAM(S): at 02:32

## 2022-12-20 RX ADMIN — Medication 975 MILLIGRAM(S): at 15:45

## 2022-12-20 RX ADMIN — Medication 600 MILLIGRAM(S): at 05:33

## 2022-12-20 RX ADMIN — Medication 975 MILLIGRAM(S): at 03:32

## 2022-12-20 RX ADMIN — Medication 975 MILLIGRAM(S): at 10:00

## 2022-12-20 RX ADMIN — Medication 1 TABLET(S): at 11:31

## 2022-12-20 RX ADMIN — Medication 975 MILLIGRAM(S): at 09:10

## 2022-12-20 RX ADMIN — Medication 600 MILLIGRAM(S): at 12:15

## 2022-12-20 RX ADMIN — Medication 600 MILLIGRAM(S): at 23:52

## 2022-12-20 NOTE — DISCHARGE NOTE OB - PATIENT PORTAL LINK FT
You can access the FollowMyHealth Patient Portal offered by [facility] by registering at the following website: [email or web address]. By joining [employer]’s FollowMyHealth portal, you will also be able to view your health information using other applications (apps) compatible with our system.

## 2022-12-20 NOTE — PROGRESS NOTE ADULT - NS ATTEND AMEND GEN_ALL_CORE FT
Ob Attg Note:  Pt is doing well.  I agree w/ the daily note entered today, and the plan of care. Ob Attg Note:  Pt is doing well.  I agree w/ the daily note entered today, and the plan of care.  pt to be discharged tomorrow.

## 2022-12-20 NOTE — DISCHARGE NOTE OB - CARE PROVIDER_API CALL
Maximus Valdez)  Obstetrics and Gynecology  [address] 234 beds at the [facility], [address]  [place] [address]  Phone: [phone]  Fax: [phone]  Follow Up Time:

## 2022-12-20 NOTE — DISCHARGE NOTE OB - MEDICATION SUMMARY - MEDICATIONS TO TAKE
I will START or STAY ON the medications listed below when I get home from the hospital:    ibuprofen 200 mg oral tablet  -- 3 tab(s) by mouth every 6 hours, As Needed  -- Indication: For pain, cramps or fever    acetaminophen 500 mg oral tablet  -- 2 tab(s) by mouth every 6 hours, As Needed  -- Indication: For pain, cramps or fever    lanolin topical ointment  -- 1 application on skin every 6 hours, As needed, nipple soreness  -- Indication: For cracked or sore nipples    Prenatal Multivitamins with Folic Acid 1 mg oral tablet  -- 1 tab(s) by mouth once a day  -- Indication: For Vitamins    Slow Fe (as elemental iron) 45 mg oral tablet, extended release  -- 1 tab(s) by mouth once a day  -- Indication: For iron supplement    Colace 100 mg oral capsule  -- 1 cap(s) by mouth 2 times a day, As Needed  -- Indication: For constipation    simethicone 80 mg oral tablet, chewable  -- 1 tab(s) by mouth every 4 hours, As needed, Gas  -- Indication: For gas pain

## 2022-12-20 NOTE — DISCHARGE NOTE OB - NS MD DC FALL RISK RISK
For information on Fall & Injury Prevention, visit: [email or web address] OR  [email or web address] OR  https://www.cdc.gov/steadi/patient.html

## 2022-12-20 NOTE — DISCHARGE NOTE OB - HOSPITAL COURSE
Patient delivered vaginally without any complications.  Her postpartum course was uneventful, and she reached appropriate postoperative milestones.  There was no excessive or abnormal vaginal bleeding.  Her uterine tone was normal.   There was no sing or symptom of infections.  She was discharged home on postpartum day number 2.    Discharge and follow up instructions discussed with patient.

## 2022-12-20 NOTE — DISCHARGE NOTE OB - MEDICATION SUMMARY - MEDICATIONS TO STOP TAKING
I will STOP taking the medications listed below when I get home from the hospital:    Valtrex 1 g oral tablet  -- 1 tab(s) by mouth 2 times a day    Aspir 81 oral delayed release tablet  -- 1 tab(s) by mouth once a day

## 2022-12-21 VITALS
SYSTOLIC BLOOD PRESSURE: 108 MMHG | HEART RATE: 75 BPM | RESPIRATION RATE: 17 BRPM | OXYGEN SATURATION: 99 % | TEMPERATURE: 99 F | DIASTOLIC BLOOD PRESSURE: 75 MMHG

## 2022-12-21 PROCEDURE — 82570 ASSAY OF URINE CREATININE: CPT

## 2022-12-21 PROCEDURE — 86900 BLOOD TYPING SEROLOGIC ABO: CPT

## 2022-12-21 PROCEDURE — 85610 PROTHROMBIN TIME: CPT

## 2022-12-21 PROCEDURE — 59025 FETAL NON-STRESS TEST: CPT

## 2022-12-21 PROCEDURE — 81001 URINALYSIS AUTO W/SCOPE: CPT

## 2022-12-21 PROCEDURE — 85025 COMPLETE CBC W/AUTO DIFF WBC: CPT

## 2022-12-21 PROCEDURE — 86850 RBC ANTIBODY SCREEN: CPT

## 2022-12-21 PROCEDURE — 84550 ASSAY OF BLOOD/URIC ACID: CPT

## 2022-12-21 PROCEDURE — 59050 FETAL MONITOR W/REPORT: CPT

## 2022-12-21 PROCEDURE — 87635 SARS-COV-2 COVID-19 AMP PRB: CPT

## 2022-12-21 PROCEDURE — 84156 ASSAY OF PROTEIN URINE: CPT

## 2022-12-21 PROCEDURE — 85730 THROMBOPLASTIN TIME PARTIAL: CPT

## 2022-12-21 PROCEDURE — 83615 LACTATE (LD) (LDH) ENZYME: CPT

## 2022-12-21 PROCEDURE — 80053 COMPREHEN METABOLIC PANEL: CPT

## 2022-12-21 PROCEDURE — 86780 TREPONEMA PALLIDUM: CPT

## 2022-12-21 PROCEDURE — 85384 FIBRINOGEN ACTIVITY: CPT

## 2022-12-21 PROCEDURE — 86901 BLOOD TYPING SEROLOGIC RH(D): CPT

## 2022-12-21 PROCEDURE — 86769 SARS-COV-2 COVID-19 ANTIBODY: CPT

## 2022-12-21 RX ADMIN — VALACYCLOVIR 500 MILLIGRAM(S): 500 TABLET, FILM COATED ORAL at 05:40

## 2022-12-21 RX ADMIN — Medication 1 TABLET(S): at 11:54

## 2022-12-21 RX ADMIN — Medication 975 MILLIGRAM(S): at 09:00

## 2022-12-21 RX ADMIN — Medication 600 MILLIGRAM(S): at 05:40

## 2022-12-21 RX ADMIN — Medication 975 MILLIGRAM(S): at 08:14

## 2022-12-21 RX ADMIN — Medication 600 MILLIGRAM(S): at 06:15

## 2022-12-21 RX ADMIN — Medication 600 MILLIGRAM(S): at 11:54

## 2022-12-21 RX ADMIN — Medication 975 MILLIGRAM(S): at 03:30

## 2022-12-21 RX ADMIN — Medication 975 MILLIGRAM(S): at 02:44

## 2022-12-21 RX ADMIN — Medication 600 MILLIGRAM(S): at 12:40

## 2022-12-21 RX ADMIN — Medication 600 MILLIGRAM(S): at 00:30

## 2022-12-21 NOTE — PROGRESS NOTE ADULT - ASSESSMENT
A: 37y PPD#2 s/p  doing well.    Plan:   discharge home today.    Follow-up and when to call MD discussed.    PLEE    
A/P:  37y  PPD # 1      S/P                   doing well

## 2022-12-21 NOTE — PROGRESS NOTE ADULT - SUBJECTIVE AND OBJECTIVE BOX
Postpartum Note- PPD#1    Allergies: No Known Allergies    Blood Type  O  --  Positive  Rubella immune  RPR Negative        Patient w/o complaints, pain is controlled.    Pt is OOB, tolerating PO, passing flatus. Lochia WNL.     O:  Vital Signs Last 24 Hrs  T(C): 36.7 (20 Dec 2022 05:12), Max: 37.2 (20 Dec 2022 00:05)  T(F): 98 (20 Dec 2022 05:12), Max: 99 (20 Dec 2022 00:05)  HR: 74 (20 Dec 2022 05:12) (64 - 124)  BP: 123/85 (20 Dec 2022 05:12) (123/71 - 153/69)  BP(mean): 92 (20 Dec 2022 00:05) (89 - 99)  RR: 17 (20 Dec 2022 05:12) (17 - 18)  SpO2: 99% (20 Dec 2022 05:12) (86% - 100%)    Parameters below as of 20 Dec 2022 05:12  Patient On (Oxygen Delivery Method): room air         Gen: NAD  Heart: S1S2 RRR  Lungs: CTA b/l  Abdomen: Soft, nontender, non-distended, fundus firm.  Lochia WNL  Ext: Neg edema, Neg calf tenderness    LABS:               11.7   11.66 )-----------( 231      ( 12-19 @ 13:20 )             34.9         PAST MEDICAL & SURGICAL HISTORY:  Blood pressure elevated  a little high at drs office; being monitored      Lightheadedness  last week-went to  who thinks its possibly hypoglycemia; now eating 6 small meals per day and feels fine      Genital herpes      S/P tooth extraction  wisdom teeth removed 4 yrs ago      S/P ACL repair        Current Issues: gHTN- BPs well controlled              
S: Patient doing well. Minimal lochia. Pain controlled.  PAST MEDICAL & SURGICAL HISTORY:  Blood pressure elevated  a little high at drs office; being monitored      Lightheadedness  last week-went to  who thinks its possibly hypoglycemia; now eating 6 small meals per day and feels fine      Genital herpes      S/P tooth extraction  wisdom teeth removed 4 yrs ago      S/P ACL repair        O: Vital Signs Last 24 Hrs  T(C): 37 (21 Dec 2022 05:14), Max: 37 (20 Dec 2022 17:06)  T(F): 98.6 (21 Dec 2022 05:14), Max: 98.6 (20 Dec 2022 17:06)  HR: 75 (21 Dec 2022 05:14) (75 - 78)  BP: 108/75 (21 Dec 2022 05:14) (108/75 - 126/82)  BP(mean): --  RR: 17 (21 Dec 2022 05:14) (17 - 18)  SpO2: 99% (21 Dec 2022 05:14) (98% - 99%)    Parameters below as of 21 Dec 2022 05:14  Patient On (Oxygen Delivery Method): room air        Gen: NAD  Abd: soft, NT, ND, fundus firm below umbilicus  Lochia: moderate  Ext: no tenderness    Labs:                        11.7   11.66 )-----------( 231      ( 19 Dec 2022 13:20 )             34.9
